# Patient Record
Sex: MALE | HISPANIC OR LATINO | URBAN - METROPOLITAN AREA
[De-identification: names, ages, dates, MRNs, and addresses within clinical notes are randomized per-mention and may not be internally consistent; named-entity substitution may affect disease eponyms.]

---

## 2019-10-16 ENCOUNTER — OFFICE VISIT (OUTPATIENT)
Dept: URBAN - METROPOLITAN AREA CLINIC 11 | Facility: CLINIC | Age: 84
End: 2019-10-16
Payer: MEDICARE

## 2019-10-16 PROCEDURE — 92014 COMPRE OPH EXAM EST PT 1/>: CPT | Performed by: OPTOMETRIST

## 2019-10-16 ASSESSMENT — INTRAOCULAR PRESSURE
OD: 12
OS: 12

## 2019-10-16 ASSESSMENT — VISUAL ACUITY
OD: 20/25
OS: 20/50

## 2019-10-16 NOTE — IMPRESSION/PLAN
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 Plan: Discussed diagnosis in detail with patient. No treatment is required at this time. Will continue to observe condition and or symptoms. Call if [address] worsens.

## 2020-09-16 ENCOUNTER — OFFICE VISIT (OUTPATIENT)
Dept: URBAN - METROPOLITAN AREA CLINIC 11 | Facility: CLINIC | Age: 85
End: 2020-09-16
Payer: MEDICARE

## 2020-09-16 DIAGNOSIS — H35.3131 NONEXUDATIVE AGE-RELATED MACULAR DEGENERATION, BILATERAL, EARLY DRY STAGE: ICD-10-CM

## 2020-09-16 DIAGNOSIS — H26.492 OTHER SECONDARY CATARACT, LEFT EYE: ICD-10-CM

## 2020-09-16 DIAGNOSIS — H17.89 OTHER CORNEAL SCAR: Primary | ICD-10-CM

## 2020-09-16 PROCEDURE — 92250 FUNDUS PHOTOGRAPHY W/I&R: CPT | Performed by: OPTOMETRIST

## 2020-09-16 PROCEDURE — 92014 COMPRE OPH EXAM EST PT 1/>: CPT | Performed by: OPTOMETRIST

## 2020-09-16 ASSESSMENT — INTRAOCULAR PRESSURE
OD: 15
OS: 15

## 2020-09-16 NOTE — ASSESSMENT/PLAN
Impression: Photo Fundus - OD: Fair-mottling; OS Good-scarring, mottling Plan: monitor. No change in treatment based on test results.

## 2020-09-16 NOTE — IMPRESSION/PLAN
Impression: Nonexudative age-related macular degeneration, bilateral, early dry stage: H35.3131. Plan: photos of macula ordered and performed today ou. No CNV. Discuss AREDS2 vit and AG daily at f/u visit. Call if any change to Joe Ordoñez  [facility] 6 months DE & MAC OCT

## 2020-09-16 NOTE — IMPRESSION/PLAN
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 Plan: Discussed diagnosis in detail with patient. No treatment is required at this time. Will continue to observe condition and or symptoms. Call if [address] worsens.

## 2020-10-28 ENCOUNTER — SURGERY (OUTPATIENT)
Dept: URBAN - METROPOLITAN AREA SURGERY 20 | Facility: SURGERY | Age: 85
End: 2020-10-28
Payer: MEDICARE

## 2020-10-28 PROCEDURE — 66821 AFTER CATARACT LASER SURGERY: CPT | Performed by: OPHTHALMOLOGY

## 2020-11-04 ENCOUNTER — POST-OPERATIVE VISIT (OUTPATIENT)
Dept: URBAN - METROPOLITAN AREA CLINIC 11 | Facility: CLINIC | Age: 85
End: 2020-11-04
Payer: MEDICARE

## 2020-11-04 DIAGNOSIS — Z48.810 ENCOUNTER FOR SURGICAL AFTERCARE FOLLOWING SURGERY ON A SENSE ORGAN: Primary | ICD-10-CM

## 2020-11-04 PROCEDURE — 99024 POSTOP FOLLOW-UP VISIT: CPT | Performed by: OPTOMETRIST

## 2020-11-04 ASSESSMENT — INTRAOCULAR PRESSURE
OS: 13
OD: 13

## 2020-11-04 NOTE — IMPRESSION/PLAN
Impression: S/P YAG PC OS - 7 Days. Encounter for surgical aftercare following surgery on a sense organ  Z48.810. Plan: monitor f/u 6mns [place] for ARMD

## 2021-06-08 ENCOUNTER — OFFICE VISIT (OUTPATIENT)
Dept: URBAN - METROPOLITAN AREA CLINIC 11 | Facility: CLINIC | Age: 86
End: 2021-06-08
Payer: COMMERCIAL

## 2021-06-08 PROCEDURE — 92012 INTRM OPH EXAM EST PATIENT: CPT | Performed by: OPTOMETRIST

## 2021-06-08 ASSESSMENT — KERATOMETRY
OS: 43.13
OD: 43.25

## 2021-06-08 ASSESSMENT — INTRAOCULAR PRESSURE
OD: 15
OS: 14

## 2021-06-08 ASSESSMENT — VISUAL ACUITY
OD: 20/30
OS: 20/60

## 2021-06-28 ENCOUNTER — TESTING ONLY (OUTPATIENT)
Dept: URBAN - METROPOLITAN AREA CLINIC 11 | Facility: CLINIC | Age: 86
End: 2021-06-28

## 2021-06-28 DIAGNOSIS — H52.4 PRESBYOPIA: Primary | ICD-10-CM

## 2021-06-28 PROCEDURE — V2799 MISC VISION ITEM OR SERVICE: HCPCS | Performed by: OPTOMETRIST

## 2021-06-28 ASSESSMENT — VISUAL ACUITY
OD: 20/40
OS: 20/40

## 2022-11-10 ENCOUNTER — OFFICE VISIT (OUTPATIENT)
Dept: URBAN - METROPOLITAN AREA CLINIC 56 | Facility: CLINIC | Age: 87
End: 2022-11-10
Payer: MEDICARE

## 2022-11-10 DIAGNOSIS — H43.812 VITREOUS DEGENERATION, LEFT EYE: ICD-10-CM

## 2022-11-10 DIAGNOSIS — H17.9 CORNEAL SCAR: Primary | ICD-10-CM

## 2022-11-10 DIAGNOSIS — H26.491 OTHER SECONDARY CATARACT, RIGHT EYE: ICD-10-CM

## 2022-11-10 DIAGNOSIS — H35.3111 NEXDTVE AGE-RELATED MCLR DEGN, RIGHT EYE, EARLY DRY STAGE: ICD-10-CM

## 2022-11-10 DIAGNOSIS — Z96.1 PRESENCE OF INTRAOCULAR LENS: ICD-10-CM

## 2022-11-10 DIAGNOSIS — H52.4 PRESBYOPIA: ICD-10-CM

## 2022-11-10 DIAGNOSIS — H31.012 MACULA SCAR OF POSTERIOR POLE OF LEFT EYE: ICD-10-CM

## 2022-11-10 PROCEDURE — 92134 CPTRZ OPH DX IMG PST SGM RTA: CPT | Performed by: STUDENT IN AN ORGANIZED HEALTH CARE EDUCATION/TRAINING PROGRAM

## 2022-11-10 PROCEDURE — 92004 COMPRE OPH EXAM NEW PT 1/>: CPT | Performed by: STUDENT IN AN ORGANIZED HEALTH CARE EDUCATION/TRAINING PROGRAM

## 2022-11-10 ASSESSMENT — KERATOMETRY
OS: 43.81
OD: 43.09

## 2022-11-10 ASSESSMENT — VISUAL ACUITY
OS: 20/60
OD: 20/50

## 2022-11-10 ASSESSMENT — INTRAOCULAR PRESSURE
OD: 14
OS: 12

## 2022-11-10 NOTE — IMPRESSION/PLAN
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 Plan: macular changes OS >> OD, not typical AMD appearance, longstanding decreased vision. Appears stable to previous notes, no ME on mac OCT. Monitor - call with vision changes.

## 2022-11-10 NOTE — IMPRESSION/PLAN
Impression: Corneal scar: H17.9. Plan: hx of infection as a child. Central scarring, vision limitations.

## 2022-11-10 NOTE — IMPRESSION/PLAN
Impression: Presbyopia: H52.4. Plan: updated spec Rx - no change OS, large change OD wonder if specs were made correctly.  Vision limitation d/t cornea and retina

## 2023-02-07 ENCOUNTER — OFFICE VISIT (OUTPATIENT)
Dept: URBAN - METROPOLITAN AREA CLINIC 56 | Facility: LOCATION | Age: 88
End: 2023-02-07
Payer: MEDICARE

## 2023-02-07 DIAGNOSIS — H52.4 PRESBYOPIA: Primary | ICD-10-CM

## 2023-02-07 PROCEDURE — V2799 MISC VISION ITEM OR SERVICE: HCPCS | Performed by: STUDENT IN AN ORGANIZED HEALTH CARE EDUCATION/TRAINING PROGRAM

## 2023-02-07 ASSESSMENT — VISUAL ACUITY
OS: 20/50
OD: 20/60

## 2023-02-07 ASSESSMENT — INTRAOCULAR PRESSURE
OD: 13
OS: 13

## 2023-02-07 ASSESSMENT — KERATOMETRY
OD: 43.03
OS: 43.44

## 2023-02-07 NOTE — IMPRESSION/PLAN
Impression: Presbyopia: H52.4.  Plan: dr saucedo OD lens only - discussed vision limitations d/t K scarring and AMD

## 2024-02-06 ENCOUNTER — OFFICE VISIT (OUTPATIENT)
Facility: LOCATION | Age: 89
End: 2024-02-06
Payer: MEDICARE

## 2024-02-06 DIAGNOSIS — H31.012 MACULA SCAR OF POSTERIOR POLE OF LEFT EYE: ICD-10-CM

## 2024-02-06 DIAGNOSIS — H26.491 OTHER SECONDARY CATARACT, RIGHT EYE: Primary | ICD-10-CM

## 2024-02-06 DIAGNOSIS — H17.9 CORNEAL SCAR: ICD-10-CM

## 2024-02-06 DIAGNOSIS — H04.123 TEAR FILM INSUFFICIENCY OF BILATERAL LACRIMAL GLANDS: ICD-10-CM

## 2024-02-06 PROCEDURE — 92134 CPTRZ OPH DX IMG PST SGM RTA: CPT

## 2024-02-06 PROCEDURE — 99214 OFFICE O/P EST MOD 30 MIN: CPT

## 2024-02-06 ASSESSMENT — INTRAOCULAR PRESSURE
OS: 13
OD: 12

## 2024-02-06 ASSESSMENT — VISUAL ACUITY
OS: 20/40
OD: 20/50

## 2024-03-04 ENCOUNTER — SURGERY (OUTPATIENT)
Dept: URBAN - METROPOLITAN AREA SURGERY 28 | Facility: LOCATION | Age: 89
End: 2024-03-04
Payer: MEDICARE

## 2024-03-04 PROCEDURE — 66821 AFTER CATARACT LASER SURGERY: CPT | Performed by: OPHTHALMOLOGY

## 2024-03-13 ENCOUNTER — POST-OPERATIVE VISIT (OUTPATIENT)
Facility: LOCATION | Age: 89
End: 2024-03-13
Payer: MEDICARE

## 2024-03-13 DIAGNOSIS — H52.4 PRESBYOPIA: Primary | ICD-10-CM

## 2024-03-13 DIAGNOSIS — Z48.810 ENCOUNTER FOR SURGICAL AFTERCARE FOLLOWING SURGERY ON A SENSE ORGAN: ICD-10-CM

## 2024-03-13 PROCEDURE — 99024 POSTOP FOLLOW-UP VISIT: CPT

## 2024-03-13 ASSESSMENT — INTRAOCULAR PRESSURE
OS: 12
OD: 14

## 2024-03-13 ASSESSMENT — VISUAL ACUITY
OD: 20/30
OS: 20/30

## 2024-04-02 ENCOUNTER — OFFICE VISIT (OUTPATIENT)
Facility: LOCATION | Age: 89
End: 2024-04-02

## 2024-04-02 DIAGNOSIS — H04.123 TEAR FILM INSUFFICIENCY OF BILATERAL LACRIMAL GLANDS: ICD-10-CM

## 2024-04-02 DIAGNOSIS — H52.4 PRESBYOPIA: Primary | ICD-10-CM

## 2024-04-02 ASSESSMENT — INTRAOCULAR PRESSURE
OD: 8
OS: 11

## 2024-04-02 ASSESSMENT — VISUAL ACUITY
OS: 20/50
OD: 20/25